# Patient Record
Sex: MALE | Race: WHITE | NOT HISPANIC OR LATINO
[De-identification: names, ages, dates, MRNs, and addresses within clinical notes are randomized per-mention and may not be internally consistent; named-entity substitution may affect disease eponyms.]

---

## 2023-11-20 ENCOUNTER — NON-APPOINTMENT (OUTPATIENT)
Age: 28
End: 2023-11-20

## 2023-11-20 ENCOUNTER — TRANSCRIPTION ENCOUNTER (OUTPATIENT)
Age: 28
End: 2023-11-20

## 2023-11-20 ENCOUNTER — APPOINTMENT (OUTPATIENT)
Dept: CARDIOLOGY | Facility: CLINIC | Age: 28
End: 2023-11-20
Payer: MEDICAID

## 2023-11-20 VITALS — SYSTOLIC BLOOD PRESSURE: 122 MMHG | DIASTOLIC BLOOD PRESSURE: 88 MMHG

## 2023-11-20 VITALS
BODY MASS INDEX: 28.04 KG/M2 | OXYGEN SATURATION: 98 % | WEIGHT: 185 LBS | DIASTOLIC BLOOD PRESSURE: 84 MMHG | RESPIRATION RATE: 18 BRPM | TEMPERATURE: 97.6 F | SYSTOLIC BLOOD PRESSURE: 122 MMHG | HEART RATE: 86 BPM | HEIGHT: 68 IN

## 2023-11-20 DIAGNOSIS — Z87.898 PERSONAL HISTORY OF OTHER SPECIFIED CONDITIONS: ICD-10-CM

## 2023-11-20 DIAGNOSIS — F12.91 CANNABIS USE, UNSPECIFIED, IN REMISSION: ICD-10-CM

## 2023-11-20 DIAGNOSIS — G43.909 MIGRAINE, UNSPECIFIED, NOT INTRACTABLE, W/OUT STATUS MIGRAINOSUS: ICD-10-CM

## 2023-11-20 DIAGNOSIS — Z78.9 OTHER SPECIFIED HEALTH STATUS: ICD-10-CM

## 2023-11-20 DIAGNOSIS — R56.9 UNSPECIFIED CONVULSIONS: ICD-10-CM

## 2023-11-20 DIAGNOSIS — F10.21 ALCOHOL DEPENDENCE, IN REMISSION: ICD-10-CM

## 2023-11-20 PROBLEM — Z00.00 ENCOUNTER FOR PREVENTIVE HEALTH EXAMINATION: Status: ACTIVE | Noted: 2023-11-20

## 2023-11-20 PROCEDURE — 93000 ELECTROCARDIOGRAM COMPLETE: CPT

## 2023-11-20 PROCEDURE — 99204 OFFICE O/P NEW MOD 45 MIN: CPT | Mod: 25

## 2023-12-08 ENCOUNTER — APPOINTMENT (OUTPATIENT)
Dept: CARDIOLOGY | Facility: CLINIC | Age: 28
End: 2023-12-08
Payer: MEDICAID

## 2023-12-08 PROCEDURE — 93306 TTE W/DOPPLER COMPLETE: CPT

## 2023-12-22 ENCOUNTER — APPOINTMENT (OUTPATIENT)
Dept: CARDIOLOGY | Facility: CLINIC | Age: 28
End: 2023-12-22
Payer: MEDICAID

## 2023-12-22 PROCEDURE — 93015 CV STRESS TEST SUPVJ I&R: CPT

## 2024-01-05 ENCOUNTER — APPOINTMENT (OUTPATIENT)
Dept: CARDIOLOGY | Facility: CLINIC | Age: 29
End: 2024-01-05

## 2024-01-09 ENCOUNTER — APPOINTMENT (OUTPATIENT)
Dept: CARDIOLOGY | Facility: CLINIC | Age: 29
End: 2024-01-09
Payer: MEDICAID

## 2024-01-09 VITALS
HEART RATE: 87 BPM | SYSTOLIC BLOOD PRESSURE: 118 MMHG | WEIGHT: 192 LBS | OXYGEN SATURATION: 98 % | BODY MASS INDEX: 29.19 KG/M2 | DIASTOLIC BLOOD PRESSURE: 64 MMHG

## 2024-01-09 DIAGNOSIS — Z92.89 PERSONAL HISTORY OF OTHER MEDICAL TREATMENT: ICD-10-CM

## 2024-01-09 DIAGNOSIS — I10 ESSENTIAL (PRIMARY) HYPERTENSION: ICD-10-CM

## 2024-01-09 DIAGNOSIS — I46.9 CARDIAC ARREST, CAUSE UNSPECIFIED: ICD-10-CM

## 2024-01-09 DIAGNOSIS — R94.31 ABNORMAL ELECTROCARDIOGRAM [ECG] [EKG]: ICD-10-CM

## 2024-01-09 PROCEDURE — 99213 OFFICE O/P EST LOW 20 MIN: CPT

## 2024-01-22 ENCOUNTER — APPOINTMENT (OUTPATIENT)
Dept: NEUROLOGY | Facility: CLINIC | Age: 29
End: 2024-01-22
Payer: MEDICAID

## 2024-01-22 ENCOUNTER — NON-APPOINTMENT (OUTPATIENT)
Age: 29
End: 2024-01-22

## 2024-01-22 VITALS
HEART RATE: 67 BPM | OXYGEN SATURATION: 98 % | DIASTOLIC BLOOD PRESSURE: 95 MMHG | HEIGHT: 69 IN | WEIGHT: 190 LBS | SYSTOLIC BLOOD PRESSURE: 134 MMHG | BODY MASS INDEX: 28.14 KG/M2

## 2024-01-22 PROCEDURE — 99204 OFFICE O/P NEW MOD 45 MIN: CPT

## 2024-01-22 RX ORDER — LAMOTRIGINE 25 MG/1
TABLET ORAL
Refills: 0 | Status: DISCONTINUED | COMMUNITY
End: 2024-01-22

## 2024-01-25 NOTE — PHYSICAL EXAM
[FreeTextEntry1] : Mental Status: AxOx3,$1.50 = 6 , 3/3 registration of 3 items, 3/3 recall  Language/Speech : speech fluent  Cranial Nerves  II: Pupils equal and reactive,  VFF full III, IV, VI: EOMI V : normal sensation in V1-V3 b/l VII : no asymmetry, no nasolabial fold flattening VIII: normal hearing to voice  IX, X: normal palatal elevation, uvula midline XI: 5/5 head turn and 5/5 shoulder shrug bilaterally XII : midline tongue protrusion Motor: no drift in limbs, normal bulk and tone, 5/5 in all extremities Sensory: normal to light touch  Reflexes: brachioradialis, biceps, triceps, patella and ankles 2+ bilaterally Toes are down-going  Coordination: Normal finger to nose Gait: normal balance and gait, able to toe/heel/tandem

## 2024-01-25 NOTE — ASSESSMENT
[FreeTextEntry1] : Please continue lamotrigine, can up-titrate slowly to up to 200 mg every 12 hours  Would aim for a serum level of lamotrigine >6  Would repeat ambulatory EEG in three months No driving for now Will send an emergency spray to pharmacy: intranasal midazolam (1 spray in one nostril for convulsive seizure > 2 minutes, please repeat 1 spray in the other nostril for continued seizure ) Call 911 if seizure > 5 minutes  Follow up with Dr. Glass   Seizure Safety:   Wear a helmet when biking or horseback riding No unsupervised swimming Showers rather than baths - no bath alone - risk of drowning  Adjust the temperature on hot water heater to avoid scalding If uncontrolled seizures, use microwave rather than stovetop Dont lock door in bathroom, bedroom or on places  If fall off bed with seizures, try sleeping with mattress on the floor  Use soft or padded furniture  Avoid high ladders  Take medication as prescribed Follow driving regulations of people with epilepsy.  Please visit Epilepsy Foundation : [email or web address]://[email or web address].com/

## 2024-01-25 NOTE — HISTORY OF PRESENT ILLNESS
[FreeTextEntry1] : Jaden is a 28-year-old  man presenting for a second opinion on seizures.  He presented to [facility] on 11/16/23. He had an episode of loss of consciousness. Per his report, he was sitting on a barstool talking to a friend. He had ringing in his ears and tunnel vision.  Heart rate awas up to 150 bpm. He was afebrile. Pulse was 84. BP was 107/57. EKG was sinus rhythm with sinus arrhythmia. Cannot rule out sinus arrhythmia, age undetermined. He was discharged with instructions to follow up [facility] Dr. Glass.  [facility] brain seizure protocol was [facility] by [facility].  He was started on lamotrigine and is tirating this upward.   Per his report, in November, he was out with friends. He vaped some marijuana which he typically does. He was not drinking alcohol. All of a sudden he felt very tired. He said his girlfriends name and his back arched. He fell to the side, his legs were stiff, he was still clutching the chair. Eyes were open. Jaw was opening and closing. Girlfriend who witnessed this states he looked possessed. She tried to touch his face and it was stiff. His legs were moving oddly. Pelvis was moving upwards. After a minute he came to and had no memory of what happened. He sat up and then again lost consciousness. His eyes were open, rolling back on the floor. An EMT was there and gave him CPR. He came to a minute later.   In 2018, he may have had another episode but was using xanax at the time. Another time, he was doing xanax and smokign marijuana and he woke up on the floor. Eyes were open. He had another event where he fell backwards and his eyes were open. No foaming or clonic jerking. No tongue bite or loss of urine. He was confused afterwards.   He had another event in 2020 where he fell backwards, lost consciousness. Not using alcohol or drugs at that time.   Allergies Seasonal Allergies  Surgery [place] teeth extraction  Medications lamotrigine 50 mg every 12 hours    Family History Mother - COPD, smokes cigarettes, alcohol use disorder  Father - htn, mitral valve prolapse  Siblings - two brothers - half brother (paternal) - none Full brother - vasovagal syncope, drinks a lot  Paternal cousin - history of seizure, 1980s , as a baby, unclear reason  No history of meningitis or encephalitis No history of major head injury  Term birth , vaginal birh Developed normally  Never held back in school  No history of febrile seizures  Paternal cousin with seziures   Social History lives in [facility]  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   
This document is complete and the patient is ready for discharge.

## 2024-02-14 RX ORDER — LAMOTRIGINE 50 MG/1
50 TABLET ORAL
Qty: 180 | Refills: 3 | Status: ACTIVE | COMMUNITY
Start: 2024-02-14 | End: 1900-01-01

## 2024-04-29 ENCOUNTER — APPOINTMENT (OUTPATIENT)
Dept: NEUROLOGY | Facility: CLINIC | Age: 29
End: 2024-04-29
Payer: MEDICAID

## 2024-04-29 VITALS
SYSTOLIC BLOOD PRESSURE: 116 MMHG | BODY MASS INDEX: 28.14 KG/M2 | OXYGEN SATURATION: 99 % | DIASTOLIC BLOOD PRESSURE: 77 MMHG | HEIGHT: 69 IN | HEART RATE: 61 BPM | WEIGHT: 190 LBS

## 2024-04-29 DIAGNOSIS — F32.A ANXIETY DISORDER, UNSPECIFIED: ICD-10-CM

## 2024-04-29 DIAGNOSIS — F41.9 ANXIETY DISORDER, UNSPECIFIED: ICD-10-CM

## 2024-04-29 PROCEDURE — 99214 OFFICE O/P EST MOD 30 MIN: CPT

## 2024-05-14 ENCOUNTER — APPOINTMENT (OUTPATIENT)
Dept: NEUROLOGY | Facility: CLINIC | Age: 29
End: 2024-05-14
Payer: MEDICAID

## 2024-05-14 PROCEDURE — 95816 EEG AWAKE AND DROWSY: CPT

## 2024-05-15 ENCOUNTER — APPOINTMENT (OUTPATIENT)
Dept: NEUROLOGY | Facility: CLINIC | Age: 29
End: 2024-05-15

## 2024-05-15 PROCEDURE — 95700 EEG CONT REC W/VID EEG TECH: CPT

## 2024-05-15 PROCEDURE — 95708 EEG WO VID EA 12-26HR UNMNTR: CPT

## 2024-05-15 PROCEDURE — 95719 EEG PHYS/QHP EA INCR W/O VID: CPT

## 2024-05-21 NOTE — DATA REVIEWED
[de-identified] : 12/12/23: MRI brain: no evidence of acute hemorrhage, territorial infarct or abnormal  enhancement. No structural abnormality.  [de-identified] : 11/2023 CT head: normal

## 2024-05-21 NOTE — DISCUSSION/SUMMARY
[FreeTextEntry1] : 27 yo RH man with a history of two bilateral tonic clonic seizures in November 2023, one right after the other. Reported to stiffen and turn blue. Was given CPR. Had other episodes of loss of consciousness, at least three, though those were confounded by drug/xanax use. Uses marijuana. Ambulatory EEG and MRI brain w/wo reported normal. Follows with Dr. Glass. Not driving. Doing well on lamotrigine 50 mg bid, no side effects. Suspect predisposition to epilepsy given prior events.   Recent [facility] ED visit for feelings of agitation and emotional lability. Labwork was WNL. Lamotrigine level was slightly low (1.9). Provided reassurance for patient. Speech is slightly pressured and he is anxious - suspect he has manic tendencies. Lamotrigine is a good medication for mood stabilization/anxiety. Will provide referral to a psychiatrist.

## 2024-05-21 NOTE — ASSESSMENT
[FreeTextEntry1] :  Ambulatory EEG   Continue lamotrigine 50 mg every 12 hours  Psychiatry Referral  Can try Troy Beaulieu MD: [phone]  __________________________________________________________________________ Can try:  Counseling/psychiatry Affiliated with [facility]  Phone [phone] Call us for more information Counseling services also available in [place]. Call [phone] to schedule an appointment.  [address] [place] [address]  ______________________________________________________________ Dr. Alfred Booker: [address] [facility] [address] [place] [address]    Dr. Helena Hill: [phone]  Return to clinic in one month to see Dr. Anais Barksdale   Seizure Safety:   Wear a helmet when biking or horseback riding No unsupervised swimming Showers rather than baths - no bath alone - risk of drowning  Adjust the temperature on hot water heater to avoid scalding If uncontrolled seizures, use microwave rather than stovetop Dont lock door in bathroom, bedroom or on places  If fall off bed with seizures, try sleeping with mattress on the floor  Use soft or padded furniture  Avoid high ladders  Take medication as prescribed Follow driving regulations of people with epilepsy.  Please visit Epilepsy Foundation : [email or web address]/

## 2024-05-21 NOTE — PHYSICAL EXAM
[FreeTextEntry1] : Mental Status: Gives detailed history, anxious affect, loud tone of voice, speech pressured  Cranial Nerves  III, IV, VI: EOMI VII : no asymmetry, no nasolabial fold flattening VIII: normal hearing to voice  Motor: no drift in limbs Gait: normal balance and gait

## 2024-05-21 NOTE — HISTORY OF PRESENT ILLNESS
[FreeTextEntry1] : Last seen in clinic on 1/22/24. Feels very anxious, alternating with feeling sad and angry. Drinks 2-3 cups of coffee a day. Smokes marijuana. Has some auditory misperceptions, not rick hallucinations. On 4/25/24, he presented with increase agitation and emotional lability to [facility] ED. Vitals were stable. Neurology consulted with question if lamotrigine was contributing to medical changes. Stated that low suspicion for this and lamotrigine is typically good for mood/manic like symptoms. Did tell ED, that there was no contraindication to VPA.   Jaden had reported missing some doses of lamotrigine. Labwork was unremarkable including TSH, creatinine, sodium. Lamotrigine level was low at 1.9   Medications include:  lamotrigine 50 mg twice a day  ____________________ 1/22/24   Jaden is a 28-year-old  man presenting for a second opinion on seizures.  He presented to [facility] on 11/16/23. He had an episode of loss of consciousness. Per his report, he was sitting on a barstool talking to a friend. He had ringing in his ears and tunnel vision.  Heart rate awas up to 150 bpm. He was afebrile. Pulse was 84. BP was 107/57. EKG was sinus rhythm with sinus arrhythmia. Cannot rule out sinus arrhythmia, age undetermined. He was discharged with instructions to follow up [facility] Dr. Glass.  [facility] brain seizure protocol was normal by [facility].  He was started on lamotrigine and is tirating this upward.   Per his report, in November, he was out with friends. He vaped some marijuana which he typically does. He was not drinking alcohol. All of a sudden he felt very tired. He said his girlfriends name and his back arched. He fell to the side, his legs were stiff, he was still clutching the chair. Eyes were open. Jaw was opening and closing. Girlfriend who witnessed this states he looked possessed. She tried to touch his face and it was stiff. His legs were moving oddly. Pelvis was moving upwards. After a minute he came to and had no memory of what happened. He sat up and then again lost consciousness. His eyes were open, rolling back on the floor. An EMT was there and gave him CPR. He came to a minute later.   In 2018, he may have had another episode but was using xanax at the time. Another time, he was doing xanax and smokign marijuana and he woke up on the floor. Eyes were open. He had another event where he fell backwards and his eyes were open. No foaming or clonic jerking. No tongue bite or loss of urine. He was confused afterwards.   He had another event in 2020 where he fell backwards, lost consciousness. Not using alcohol or drugs at that time.   Allergies Seasonal Allergies  Surgery [place] teeth extraction  Medications lamotrigine 50 mg every 12 hours    Family History Mother - COPD, smokes cigarettes, alcohol use disorder  Father - htn, mitral valve prolapse  Siblings - two brothers - half brother (paternal) - none Full brother - vasovagal syncope, drinks a lot  Paternal cousin - history of seizure, 1980s , as a baby, unclear reason  No history of meningitis or encephalitis No history of major head injury  Term birth , vaginal birth Developed normally  Never held back in school  No history of febrile seizures  Paternal cousin with nitin   Social History lives in [facility] [place]  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

## 2024-06-14 RX ORDER — MIDAZOLAM 5 MG/.1ML
5 SPRAY NASAL
Qty: 1 | Refills: 0 | Status: ACTIVE | COMMUNITY
Start: 2024-06-14 | End: 1900-01-01

## 2024-07-23 ENCOUNTER — NON-APPOINTMENT (OUTPATIENT)
Age: 29
End: 2024-07-23

## 2024-07-24 ENCOUNTER — APPOINTMENT (OUTPATIENT)
Dept: NEUROLOGY | Facility: CLINIC | Age: 29
End: 2024-07-24
Payer: MEDICAID

## 2024-07-24 VITALS
WEIGHT: 179 LBS | OXYGEN SATURATION: 98 % | HEART RATE: 67 BPM | SYSTOLIC BLOOD PRESSURE: 117 MMHG | BODY MASS INDEX: 26.51 KG/M2 | HEIGHT: 69 IN | DIASTOLIC BLOOD PRESSURE: 72 MMHG

## 2024-07-24 PROCEDURE — 99215 OFFICE O/P EST HI 40 MIN: CPT

## 2024-07-24 RX ORDER — GABAPENTIN 250 MG/5ML
300 SOLUTION ORAL DAILY
Qty: 180 | Refills: 0 | Status: ACTIVE | COMMUNITY
Start: 2024-07-24 | End: 1900-01-01

## 2024-07-24 NOTE — DATA REVIEWED
[de-identified] : 12/12/23: MRI brain: no evidence of acute hemorrhage, territorial infarct or abnormal  enhancement. No structural abnormality.  [de-identified] : Ambulatory EE/14-5/15/24: occasional left>right temporal slowing suggestive of focal dysfunction  in these regions. There were no findings of active epilepsy, however this alone does not rule out the  diagnosis.  [de-identified] : 11/2023 CT head: normal

## 2024-07-24 NOTE — ASSESSMENT
[FreeTextEntry1] : We can continue off of lamotrigine for now  Can take magnesium glycinate every night for insomnia, anxiety.   Please call me if seizure  Can take Nayzilam if seizure - one spray in one nostril for convulsion greater than 2 minutes - and if seizure continues greater than 2 minutes, can take another spray in the other nostril  I will send gabapentin to the pharmacy, take 300 mg once a day as needed for anxiety  Return to clinic in 4-5 months to see Dr. Manzo   Psychiatry Referral  Can try Troy Beaulieu MD: [phone] __________________________________________________________________________ Can try: Counseling/psychiatry Affiliated with [facility] Phone [phone] Call us for more information Counseling services also available in [place]. Call [phone] to schedule an appointment.  [address] [place] [id number]  ______________________________________________________________ Dr. Alfred Booker: [address] [address]  Dr. Helena Hill: [phone] Call if seizure safety   Seizure Safety:   Wear a helmet when biking or horseback riding No unsupervised swimming Showers rather than baths - no bath alone - risk of drowning  Adjust the temperature on hot water heater to avoid scalding If uncontrolled seizures, use microwave rather than stovetop Dont lock door in bathroom, bedroom or on places  If fall off bed with seizures, try sleeping with mattress on the floor  Use soft or padded furniture  Avoid high ladders  Take medication as prescribed Follow driving regulations of people with epilepsy.  Please visit Epilepsy Foundation : [email or web address]://www.epilepsy.com/

## 2024-07-24 NOTE — PHYSICAL EXAM
[FreeTextEntry1] : Mental Status: Alert, oriented. Gives detailed history Language/Speech : speech slightly pressured  Cranial Nerves  III, IV, VI: EOMI VII : no asymmetry, no nasolabial fold flattening VIII: normal hearing to voice  Motor: no drift in limbs Gait: normal balance and gait

## 2024-07-24 NOTE — DATA REVIEWED
[de-identified] : 12/12/23: MRI brain: no evidence of acute hemorrhage, territorial infarct or abnormal  enhancement. No structural abnormality.  [de-identified] : Ambulatory EE/14-5/15/24: occasional left>right temporal slowing suggestive of focal dysfunction  in these regions. There were no findings of active epilepsy, however this alone does not rule out the  diagnosis.  [de-identified] : 11/2023 CT head: normal

## 2024-07-24 NOTE — HISTORY OF PRESENT ILLNESS
[FreeTextEntry1] : Presenting with girlfriend. Last seen in clinic on April 29th. He is completely off lamotrigine. He just could not tolerate it. It made him more hyper/agitated/irritable. He is not taking any seizure medication right now. Doing okay. Trying to cut down on caffeine. No alcohol. Smokes marijuana (from dispensary).  Does drink energy drinks. Girlfriend wants him to cut down. Not abusing benzodiazepines. Has Nayzilam at home (emergency spray). Has not met with a psychiatrist/psychologist as recommended. Feeling better now.  Does not drive.  ______________________________________________________________ 4/29/24  Last seen in clinic on 1/22/24. Feels very anxious, alternating with feeling sad and angry. Drinks 2-3 cups of coffee a day. Smokes marijuana. Has some auditory misperceptions, not rick hallucinations. On 4/25/24, he presented with increase agitation and emotional lability to [facility] ED. Vitals were stable. Neurology consulted with question if lamotrigine was contributing to medical changes. Stated that low suspicion for this and lamotrigine is typically good for mood/manic like symptoms. Did tell ED, that there was no contraindication to VPA.   Jaden had reported missing some doses of lamotrigine. Labwork was unremarkable including TSH, creatinine, sodium. Lamotrigine level was low at 1.9   Medications include:  lamotrigine 50 mg twice a day  ____________________ 1/22/24   Jaden is a 28-year-old  man presenting for a second opinion on seizures.  He presented to [facility] on 11/16/23. He had an episode of loss of consciousness. Per his report, he was sitting on a barstool talking to a friend. He had ringing in his ears and tunnel vision.  Heart rate awas up to 150 bpm. He was afebrile. Pulse was 84. BP was 107/57. EKG was sinus rhythm with sinus arrhythmia. Cannot rule out sinus arrhythmia, age undetermined. He was discharged with instructions to follow up [facility] Dr. Glass.  [facility] brain seizure protocol was normal by [facility].  He was started on lamotrigine and is tirating this upward.   Per his report, in November, he was out with friends. He vaped some marijuana which he typically does. He was not drinking alcohol. All of a sudden he felt very tired. He said his girlfriends name and his back arched. He fell to the side, his legs were stiff, he was still clutching the chair. Eyes were open. Jaw was opening and closing. Girlfriend who witnessed this states he looked possessed. She tried to touch his face and it was stiff. His legs were moving oddly. Pelvis was moving upwards. After a minute he came to and had no memory of what happened. He sat up and then again lost consciousness. His eyes were open, rolling back on the floor. An EMT was there and gave him CPR. He came to a minute later.   In 2018, he may have had another episode but was using xanax at the time. Another time, he was doing xanax and smokign marijuana and he woke up on the floor. Eyes were open. He had another event where he fell backwards and his eyes were open. No foaming or clonic jerking. No tongue bite or loss of urine. He was confused afterwards.   He had another event in 2020 where he fell backwards, lost consciousness. Not using alcohol or drugs at that time.   Allergies Seasonal Allergies  Surgery [place] teeth extraction  Medications lamotrigine 50 mg every 12 hours    Family History Mother - COPD, smokes cigarettes, alcohol use disorder  Father - htn, mitral valve prolapse  Siblings - two brothers - half brother (paternal) - none Full brother - vasovagal syncope, drinks a lot  Paternal cousin - history of seizure, 1980s , as a baby, unclear reason  No history of meningitis or encephalitis No history of major head injury  Term birth , vaginal birth Developed normally  Never held back in school  No history of febrile seizures  Paternal cousin with seziures   Social History lives in [facility] [place] [facility]  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

## 2024-07-24 NOTE — HISTORY OF PRESENT ILLNESS
[FreeTextEntry1] : Presenting with girlfriend. Last seen in clinic on April 29th. He is completely off lamotrigine. He just could not tolerate it. It made him more hyper/agitated/irritable. He is not taking any seizure medication right now. Doing okay. Trying to cut down on caffeine. No alcohol. Smokes marijuana (from dispensary).  Does drink energy drinks. Girlfriend wants him to cut down. Not abusing benzodiazepines. Has Nayzilam at home (emergency spray). Has not met with a psychiatrist/psychologist as recommended. Feeling better now.  Does not drive.  ______________________________________________________________ 4/29/24  Last seen in clinic on 1/22/24. Feels very anxious, alternating with feeling sad and angry. Drinks 2-3 cups of coffee a day. Smokes marijuana. Has some auditory misperceptions, not rick hallucinations. On 4/25/24, he presented with increase agitation and emotional lability to [facility] ED. Vitals were stable. Neurology consulted with question if lamotrigine was contributing to medical changes. Stated that low suspicion for this and lamotrigine is typically good for mood/manic like symptoms. Did tell ED, that there was no contraindication to VPA.   Jaden had reported missing some doses of lamotrigine. Labwork was unremarkable including TSH, creatinine, sodium. Lamotrigine level was low at 1.9   Medications include:  lamotrigine 50 mg twice a day  ____________________ 1/22/24   Jaden is a 28-year-old  man presenting for a second opinion on seizures.  He presented to [facility] on 11/16/23. He had an episode of loss of consciousness. Per his report, he was sitting on a barstool talking to a friend. He had ringing in his ears and tunnel vision.  Heart rate awas up to 150 bpm. He was afebrile. Pulse was 84. BP was 107/57. EKG was sinus rhythm with sinus arrhythmia. Cannot rule out sinus arrhythmia, age undetermined. He was discharged with instructions to follow up [facility] Dr. Glass.  [facility] brain seizure protocol was normal by [facility].  He was started on lamotrigine and is tirating this upward.   Per his report, in November, he was out with friends. He vaped some marijuana which he typically does. He was not drinking alcohol. All of a sudden he felt very tired. He said his girlfriends name and his back arched. He fell to the side, his legs were stiff, he was still clutching the chair. Eyes were open. Jaw was opening and closing. Girlfriend who witnessed this states he looked possessed. She tried to touch his face and it was stiff. His legs were moving oddly. Pelvis was moving upwards. After a minute he came to and had no memory of what happened. He sat up and then again lost consciousness. His eyes were open, rolling back on the floor. An EMT was there and gave him CPR. He came to a minute later.   In 2018, he may have had another episode but was using xanax at the time. Another time, he was doing xanax and smokign marijuana and he woke up on the floor. Eyes were open. He had another event where he fell backwards and his eyes were open. No foaming or clonic jerking. No tongue bite or loss of urine. He was confused afterwards.   He had another event in 2020 where he fell backwards, lost consciousness. Not using alcohol or drugs at that time.   Allergies Seasonal Allergies  Surgery [place] teeth extraction  Medications lamotrigine 50 mg every 12 hours    Family History Mother - COPD, smokes cigarettes, alcohol use disorder  Father - htn, mitral valve prolapse  Siblings - two brothers - half brother (paternal) - none Full brother - vasovagal syncope, drinks a lot  Paternal cousin - history of seizure, 1980s , as a baby, unclear reason  No history of meningitis or encephalitis No history of major head injury  Term birth , vaginal birth Developed normally  Never held back in school  No history of febrile seizures  Paternal cousin with seziures   Social History [place] in [facility] [place]  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

## 2024-08-19 RX ORDER — GABAPENTIN 250 MG/5ML
250 SOLUTION ORAL
Qty: 900 | Refills: 0 | Status: ACTIVE | COMMUNITY
Start: 2024-08-19 | End: 1900-01-01

## 2025-01-21 ENCOUNTER — APPOINTMENT (OUTPATIENT)
Dept: NEUROLOGY | Facility: CLINIC | Age: 30
End: 2025-01-21
Payer: COMMERCIAL

## 2025-01-21 VITALS
DIASTOLIC BLOOD PRESSURE: 83 MMHG | OXYGEN SATURATION: 98 % | HEART RATE: 89 BPM | BODY MASS INDEX: 26.43 KG/M2 | WEIGHT: 179 LBS | SYSTOLIC BLOOD PRESSURE: 154 MMHG

## 2025-01-21 DIAGNOSIS — R56.9 UNSPECIFIED CONVULSIONS: ICD-10-CM

## 2025-01-21 PROCEDURE — 99214 OFFICE O/P EST MOD 30 MIN: CPT

## 2025-02-03 ENCOUNTER — APPOINTMENT (OUTPATIENT)
Dept: NEUROLOGY | Facility: CLINIC | Age: 30
End: 2025-02-03

## 2025-02-03 PROCEDURE — 95819 EEG AWAKE AND ASLEEP: CPT

## 2025-02-04 PROCEDURE — 95721 EEG PHY/QHP>36<60 HR W/O VID: CPT

## 2025-02-05 ENCOUNTER — APPOINTMENT (OUTPATIENT)
Dept: NEUROLOGY | Facility: CLINIC | Age: 30
End: 2025-02-05

## 2025-02-05 PROCEDURE — 95700 EEG CONT REC W/VID EEG TECH: CPT

## 2025-02-05 PROCEDURE — 95708 EEG WO VID EA 12-26HR UNMNTR: CPT

## 2025-04-22 ENCOUNTER — APPOINTMENT (OUTPATIENT)
Dept: NEUROLOGY | Facility: CLINIC | Age: 30
End: 2025-04-22

## 2025-06-17 ENCOUNTER — NON-APPOINTMENT (OUTPATIENT)
Age: 30
End: 2025-06-17

## 2025-06-19 ENCOUNTER — TRANSCRIPTION ENCOUNTER (OUTPATIENT)
Age: 30
End: 2025-06-19